# Patient Record
Sex: MALE | Race: OTHER | HISPANIC OR LATINO | Employment: FULL TIME | URBAN - METROPOLITAN AREA
[De-identification: names, ages, dates, MRNs, and addresses within clinical notes are randomized per-mention and may not be internally consistent; named-entity substitution may affect disease eponyms.]

---

## 2021-05-27 ENCOUNTER — APPOINTMENT (EMERGENCY)
Dept: RADIOLOGY | Facility: HOSPITAL | Age: 31
End: 2021-05-27
Payer: COMMERCIAL

## 2021-05-27 ENCOUNTER — HOSPITAL ENCOUNTER (EMERGENCY)
Facility: HOSPITAL | Age: 31
Discharge: HOME | End: 2021-05-27
Attending: EMERGENCY MEDICINE | Admitting: EMERGENCY MEDICINE
Payer: COMMERCIAL

## 2021-05-27 VITALS
HEART RATE: 68 BPM | WEIGHT: 272 LBS | TEMPERATURE: 98 F | SYSTOLIC BLOOD PRESSURE: 114 MMHG | OXYGEN SATURATION: 97 % | RESPIRATION RATE: 12 BRPM | DIASTOLIC BLOOD PRESSURE: 68 MMHG

## 2021-05-27 DIAGNOSIS — J45.41 MODERATE PERSISTENT ASTHMA WITH EXACERBATION: Primary | ICD-10-CM

## 2021-05-27 LAB
ANION GAP SERPL CALCULATED.3IONS-SCNC: 13 MMOL/L (ref 4–13)
ATRIAL RATE: 62 BPM
BASOPHILS # BLD AUTO: 0.04 THOUSANDS/ΜL (ref 0–0.1)
BASOPHILS NFR BLD AUTO: 1 % (ref 0–1)
BUN SERPL-MCNC: 13 MG/DL (ref 5–25)
CALCIUM SERPL-MCNC: 9.4 MG/DL (ref 8.3–10.1)
CHLORIDE SERPL-SCNC: 105 MMOL/L (ref 100–108)
CO2 SERPL-SCNC: 23 MMOL/L (ref 21–32)
CREAT SERPL-MCNC: 0.87 MG/DL (ref 0.6–1.3)
EOSINOPHIL # BLD AUTO: 0.19 THOUSAND/ΜL (ref 0–0.61)
EOSINOPHIL NFR BLD AUTO: 3 % (ref 0–6)
ERYTHROCYTE [DISTWIDTH] IN BLOOD BY AUTOMATED COUNT: 13.1 % (ref 11.6–15.1)
GFR SERPL CREATININE-BSD FRML MDRD: 116 ML/MIN/1.73SQ M
GLUCOSE SERPL-MCNC: 100 MG/DL (ref 65–140)
HCT VFR BLD AUTO: 44.7 % (ref 36.5–49.3)
HGB BLD-MCNC: 15 G/DL (ref 12–17)
IMM GRANULOCYTES # BLD AUTO: 0.02 THOUSAND/UL (ref 0–0.2)
IMM GRANULOCYTES NFR BLD AUTO: 0 % (ref 0–2)
LYMPHOCYTES # BLD AUTO: 1.99 THOUSANDS/ΜL (ref 0.6–4.47)
LYMPHOCYTES NFR BLD AUTO: 29 % (ref 14–44)
MCH RBC QN AUTO: 29.2 PG (ref 26.8–34.3)
MCHC RBC AUTO-ENTMCNC: 33.6 G/DL (ref 31.4–37.4)
MCV RBC AUTO: 87 FL (ref 82–98)
MONOCYTES # BLD AUTO: 0.5 THOUSAND/ΜL (ref 0.17–1.22)
MONOCYTES NFR BLD AUTO: 7 % (ref 4–12)
NEUTROPHILS # BLD AUTO: 4.08 THOUSANDS/ΜL (ref 1.85–7.62)
NEUTS SEG NFR BLD AUTO: 60 % (ref 43–75)
NRBC BLD AUTO-RTO: 0 /100 WBCS
P AXIS: 55 DEGREES
PLATELET # BLD AUTO: 298 THOUSANDS/UL (ref 149–390)
PMV BLD AUTO: 10 FL (ref 8.9–12.7)
POTASSIUM SERPL-SCNC: 4.1 MMOL/L (ref 3.5–5.3)
PR INTERVAL: 144 MS
QRS AXIS: 53 DEGREES
QRSD INTERVAL: 84 MS
QT INTERVAL: 410 MS
QTC INTERVAL: 416 MS
RBC # BLD AUTO: 5.13 MILLION/UL (ref 3.88–5.62)
SODIUM SERPL-SCNC: 141 MMOL/L (ref 136–145)
T WAVE AXIS: 25 DEGREES
TROPONIN I SERPL-MCNC: <0.02 NG/ML
VENTRICULAR RATE: 62 BPM
WBC # BLD AUTO: 6.82 THOUSAND/UL (ref 4.31–10.16)

## 2021-05-27 PROCEDURE — 99284 EMERGENCY DEPT VISIT MOD MDM: CPT

## 2021-05-27 PROCEDURE — 94640 AIRWAY INHALATION TREATMENT: CPT

## 2021-05-27 PROCEDURE — 93010 ELECTROCARDIOGRAM REPORT: CPT | Performed by: INTERNAL MEDICINE

## 2021-05-27 PROCEDURE — 93005 ELECTROCARDIOGRAM TRACING: CPT

## 2021-05-27 PROCEDURE — 84484 ASSAY OF TROPONIN QUANT: CPT | Performed by: PHYSICIAN ASSISTANT

## 2021-05-27 PROCEDURE — 80048 BASIC METABOLIC PNL TOTAL CA: CPT | Performed by: PHYSICIAN ASSISTANT

## 2021-05-27 PROCEDURE — 36415 COLL VENOUS BLD VENIPUNCTURE: CPT | Performed by: PHYSICIAN ASSISTANT

## 2021-05-27 PROCEDURE — 85025 COMPLETE CBC W/AUTO DIFF WBC: CPT | Performed by: PHYSICIAN ASSISTANT

## 2021-05-27 PROCEDURE — 96375 TX/PRO/DX INJ NEW DRUG ADDON: CPT

## 2021-05-27 PROCEDURE — 71045 X-RAY EXAM CHEST 1 VIEW: CPT

## 2021-05-27 PROCEDURE — 96365 THER/PROPH/DIAG IV INF INIT: CPT

## 2021-05-27 PROCEDURE — 99285 EMERGENCY DEPT VISIT HI MDM: CPT | Performed by: PHYSICIAN ASSISTANT

## 2021-05-27 RX ORDER — SODIUM CHLORIDE FOR INHALATION 0.9 %
12 VIAL, NEBULIZER (ML) INHALATION ONCE
Status: COMPLETED | OUTPATIENT
Start: 2021-05-27 | End: 2021-05-27

## 2021-05-27 RX ORDER — PREDNISONE 20 MG/1
TABLET ORAL
Qty: 20 TABLET | Refills: 0 | Status: SHIPPED | OUTPATIENT
Start: 2021-05-27

## 2021-05-27 RX ORDER — IPRATROPIUM BROMIDE AND ALBUTEROL SULFATE 2.5; .5 MG/3ML; MG/3ML
3 SOLUTION RESPIRATORY (INHALATION) 4 TIMES DAILY
Qty: 360 ML | Refills: 0 | Status: SHIPPED | OUTPATIENT
Start: 2021-05-27 | End: 2021-06-26

## 2021-05-27 RX ORDER — ALBUTEROL SULFATE 90 UG/1
2 AEROSOL, METERED RESPIRATORY (INHALATION) EVERY 6 HOURS PRN
COMMUNITY

## 2021-05-27 RX ORDER — DEXAMETHASONE SODIUM PHOSPHATE 4 MG/ML
10 INJECTION, SOLUTION INTRA-ARTICULAR; INTRALESIONAL; INTRAMUSCULAR; INTRAVENOUS; SOFT TISSUE ONCE
Status: COMPLETED | OUTPATIENT
Start: 2021-05-27 | End: 2021-05-27

## 2021-05-27 RX ORDER — MAGNESIUM SULFATE HEPTAHYDRATE 40 MG/ML
2 INJECTION, SOLUTION INTRAVENOUS ONCE
Status: COMPLETED | OUTPATIENT
Start: 2021-05-27 | End: 2021-05-27

## 2021-05-27 RX ADMIN — MAGNESIUM SULFATE HEPTAHYDRATE 2 G: 40 INJECTION, SOLUTION INTRAVENOUS at 08:00

## 2021-05-27 RX ADMIN — ALBUTEROL SULFATE 10 MG: 2.5 SOLUTION RESPIRATORY (INHALATION) at 07:58

## 2021-05-27 RX ADMIN — ISODIUM CHLORIDE 12 ML: 0.03 SOLUTION RESPIRATORY (INHALATION) at 07:58

## 2021-05-27 RX ADMIN — IPRATROPIUM BROMIDE 1 MG: 0.5 SOLUTION RESPIRATORY (INHALATION) at 07:58

## 2021-05-27 RX ADMIN — DEXAMETHASONE SODIUM PHOSPHATE 10 MG: 4 INJECTION INTRA-ARTICULAR; INTRALESIONAL; INTRAMUSCULAR; INTRAVENOUS; SOFT TISSUE at 07:59

## 2021-05-27 NOTE — ED NOTES
Nebulizer dispensed to pt per order and instructed on use  Pt verbalized understanding       Lamin Valle RN  05/27/21 7381

## 2021-05-27 NOTE — Clinical Note
Jacob trevino Zelpha Remedies to the emergency department on 5/27/2021  Return date if applicable: [id number]        If you have any questions or concerns, please don't hesitate to call        Chet Arellano RN

## 2021-05-27 NOTE — DISCHARGE INSTRUCTIONS
X-ray chest 1 view portable   ED Interpretation   Increased lung marks without focal infiltrate        Results for orders placed or performed during the hospital encounter of 05/27/21   CBC and differential   Result Value Ref Range    WBC 6 82 4 31 - 10 16 Thousand/uL    RBC 5 13 3 88 - 5 62 Million/uL    Hemoglobin 15 0 12 0 - 17 0 g/dL    Hematocrit 44 7 36 5 - 49 3 %    MCV 87 82 - 98 fL    MCH 29 2 26 8 - 34 3 pg    MCHC 33 6 31 4 - 37 4 g/dL    RDW 13 1 11 6 - 15 1 %    MPV 10 0 8 9 - 12 7 fL    Platelets 767 579 - 707 Thousands/uL    nRBC 0 /100 WBCs    Neutrophils Relative 60 43 - 75 %    Immat GRANS % 0 0 - 2 %    Lymphocytes Relative 29 14 - 44 %    Monocytes Relative 7 4 - 12 %    Eosinophils Relative 3 0 - 6 %    Basophils Relative 1 0 - 1 %    Neutrophils Absolute 4 08 1 85 - 7 62 Thousands/µL    Immature Grans Absolute 0 02 0 00 - 0 20 Thousand/uL    Lymphocytes Absolute 1 99 0 60 - 4 47 Thousands/µL    Monocytes Absolute 0 50 0 17 - 1 22 Thousand/µL    Eosinophils Absolute 0 19 0 00 - 0 61 Thousand/µL    Basophils Absolute 0 04 0 00 - 0 10 Thousands/µL   Basic metabolic panel   Result Value Ref Range    Sodium 141 136 - 145 mmol/L    Potassium 4 1 3 5 - 5 3 mmol/L    Chloride 105 100 - 108 mmol/L    CO2 23 21 - 32 mmol/L    ANION GAP 13 4 - 13 mmol/L    BUN 13 5 - 25 mg/dL    Creatinine 0 87 0 60 - 1 30 mg/dL    Glucose 100 65 - 140 mg/dL    Calcium 9 4 8 3 - 10 1 mg/dL    eGFR 116 ml/min/1 73sq m   Troponin I   Result Value Ref Range    Troponin I <0 02 <=0 04 ng/mL

## 2021-05-27 NOTE — Clinical Note
Bhargav Serrano accompanied Bennie Guzman to the emergency department on 5/27/2021  Return date if applicable: If you have any questions or concerns, please don't hesitate to call        Karen Sanchez RN

## 2021-05-27 NOTE — ED PROVIDER NOTES
History  Chief Complaint   Patient presents with    Asthma     asthma exacerbation this morning, no relief from albuterol     This is a 70-year-old male with a past medical history of asthma presents to the emergency department with shortness of breath  He reports that the last 2 or 3 days had increased use of his rescue inhaler without relief  The last use of his rescue inhaler this morning was 5:00 a m  He denies fevers chills  He has had a semi productive cough  He denies nausea, vomiting, diarrhea  Denies loss of taste or smell  No sneezing or nasal congestion  He reports chest tightness  No recent travel, lower extremity edema  He is a current smoker  Ongoing      Asthma  Associated symptoms: cough and shortness of breath    Associated symptoms: no abdominal pain, no chest pain, no congestion, no diarrhea, no ear pain, no fatigue, no fever, no headaches, no nausea, no rash, no sore throat and no vomiting        Prior to Admission Medications   Prescriptions Last Dose Informant Patient Reported? Taking? UNKNOWN TO PATIENT   Yes Yes   Sig: BP medication   UNKNOWN TO PATIENT   Yes Yes   Sig: Allergy medication   albuterol (PROVENTIL HFA,VENTOLIN HFA) 90 mcg/act inhaler 5/27/2021 at Unknown time  Yes Yes   Sig: Inhale 2 puffs every 6 (six) hours as needed for wheezing      Facility-Administered Medications: None       Past Medical History:   Diagnosis Date    Allergies     Asthma     Hypertension        Past Surgical History:   Procedure Laterality Date    APPENDECTOMY         History reviewed  No pertinent family history  I have reviewed and agree with the history as documented      E-Cigarette/Vaping     E-Cigarette/Vaping Substances     Social History     Tobacco Use    Smoking status: Current Every Day Smoker     Packs/day: 0 50     Types: Cigarettes    Smokeless tobacco: Never Used   Substance Use Topics    Alcohol use: Not Currently    Drug use: Yes     Types: Marijuana       Review of Systems   Constitutional: Negative for chills, fatigue and fever  HENT: Negative for congestion, ear pain, sneezing and sore throat  Eyes: Negative for pain and visual disturbance  Respiratory: Positive for cough, chest tightness and shortness of breath  Cardiovascular: Negative for chest pain and palpitations  Gastrointestinal: Negative for abdominal pain, diarrhea, nausea and vomiting  Genitourinary: Negative for dysuria and hematuria  Musculoskeletal: Negative for arthralgias and back pain  Skin: Negative for color change and rash  Neurological: Negative for seizures, syncope and headaches  All other systems reviewed and are negative  Physical Exam  Physical Exam  Vitals signs and nursing note reviewed  Constitutional:       General: He is not in acute distress  Appearance: Normal appearance  He is not ill-appearing, toxic-appearing or diaphoretic  HENT:      Head: Normocephalic and atraumatic  Nose: No congestion or rhinorrhea  Mouth/Throat:      Mouth: Mucous membranes are moist    Eyes:      General: No scleral icterus  Pupils: Pupils are equal, round, and reactive to light  Neck:      Musculoskeletal: Normal range of motion  Cardiovascular:      Rate and Rhythm: Normal rate and regular rhythm  Pulses: Normal pulses  Heart sounds: Normal heart sounds  Pulmonary:      Effort: Pulmonary effort is normal       Breath sounds: Wheezing (inspiratory and expiratory in all lung fields) present  Abdominal:      General: Abdomen is flat  There is no distension  Palpations: Abdomen is soft  Tenderness: There is no abdominal tenderness  There is no guarding or rebound  Hernia: No hernia is present  Musculoskeletal: Normal range of motion  General: No swelling or tenderness  Skin:     General: Skin is warm  Capillary Refill: Capillary refill takes less than 2 seconds  Neurological:      General: No focal deficit present  Mental Status: He is alert     Psychiatric:         Mood and Affect: Mood normal          Vital Signs  ED Triage Vitals [05/27/21 0731]   Temperature Pulse Respirations Blood Pressure SpO2   98 °F (36 7 °C) 83 22 137/79 98 %      Temp Source Heart Rate Source Patient Position - Orthostatic VS BP Location FiO2 (%)   Oral Monitor Sitting Right arm --      Pain Score       --           Vitals:    05/27/21 0731 05/27/21 0800 05/27/21 0830   BP: 137/79  114/68   Pulse: 83 60 68   Patient Position - Orthostatic VS: Sitting           Visual Acuity      ED Medications  Medications   ipratropium (ATROVENT) 0 02 % inhalation solution 1 mg (1 mg Nebulization Given 5/27/21 0758)   sodium chloride 0 9 % inhalation solution 12 mL (12 mL Nebulization Given 5/27/21 0758)   magnesium sulfate 2 g/50 mL IVPB (premix) 2 g (0 g Intravenous Stopped 5/27/21 0830)   dexamethasone (DECADRON) injection 10 mg (10 mg Intravenous Given 5/27/21 0759)   albuterol inhalation solution 10 mg (10 mg Nebulization Given 5/27/21 0758)       Diagnostic Studies  Results Reviewed     Procedure Component Value Units Date/Time    Troponin I [[id number]]  (Normal) Collected: 05/27/21 0757    Lab Status: Final result Specimen: Blood from Hand, Right Updated: 05/27/21 0829     Troponin I <0 02 ng/mL     Basic metabolic panel [[id number]] Collected: 05/27/21 0757    Lab Status: Final result Specimen: Blood from Hand, Right Updated: 05/27/21 0821     Sodium 141 mmol/L      Potassium 4 1 mmol/L      Chloride 105 mmol/L      CO2 23 mmol/L      ANION GAP 13 mmol/L      BUN 13 mg/dL      Creatinine 0 87 mg/dL      Glucose 100 mg/dL      Calcium 9 4 mg/dL      eGFR 116 ml/min/1 73sq m     Narrative:      Hi guidelines for Chronic Kidney Disease (CKD):     Stage 1 with normal or high GFR (GFR > 90 mL/min/1 73 square meters)    Stage 2 Mild CKD (GFR = 60-89 mL/min/1 73 square meters)    Stage 3A Moderate CKD (GFR = 45-59 mL/min/1 73 square meters)    Stage 3B Moderate CKD (GFR = 30-44 mL/min/1 73 square meters)    Stage 4 Severe CKD (GFR = 15-29 mL/min/1 73 square meters)    Stage 5 End Stage CKD (GFR <15 mL/min/1 73 square meters)  Note: GFR calculation is accurate only with a steady state creatinine    CBC and differential [[id number]] Collected: 05/27/21 0757    Lab Status: Final result Specimen: Blood from Hand, Right Updated: 05/27/21 0813     WBC 6 82 Thousand/uL      RBC 5 13 Million/uL      Hemoglobin 15 0 g/dL      Hematocrit 44 7 %      MCV 87 fL      MCH 29 2 pg      MCHC 33 6 g/dL      RDW 13 1 %      MPV 10 0 fL      Platelets 502 Thousands/uL      nRBC 0 /100 WBCs      Neutrophils Relative 60 %      Immat GRANS % 0 %      Lymphocytes Relative 29 %      Monocytes Relative 7 %      Eosinophils Relative 3 %      Basophils Relative 1 %      Neutrophils Absolute 4 08 Thousands/µL      Immature Grans Absolute 0 02 Thousand/uL      Lymphocytes Absolute 1 99 Thousands/µL      Monocytes Absolute 0 50 Thousand/µL      Eosinophils Absolute 0 19 Thousand/µL      Basophils Absolute 0 04 Thousands/µL                  X-ray chest 1 view portable   ED Interpretation by Messi Navarro PA-C (05/27 0902)   Increased lung marks without focal infiltrate      Final Result by Marie Ireland MD (05/27 1004)      No acute cardiopulmonary disease                 Workstation performed: [id number]                    Procedures  ECG 12 Lead Documentation Only    Date/Time: 5/27/2021 8:18 AM  Performed by: Messi Navarro PA-C  Authorized by: Skyler Andrews DO     Indications / Diagnosis:  SOB, chest tightness  ECG reviewed by me, the ED Provider: yes    Patient location:  ED  Previous ECG:     Previous ECG:  Unavailable  Interpretation:     Interpretation: normal    Rate:     ECG rate:  62    ECG rate assessment: normal    Rhythm:     Rhythm: sinus rhythm    Ectopy:     Ectopy: none    QRS:     QRS axis:  Normal  Conduction:     Conduction: normal    ST segments:     ST segments:  Normal  T waves:     T waves: normal               ED Course  ED Course as of May 27 1011   Thu May 27, 2021   0817 Cbc is normal      0823 BMP normal      0844 Troponin negative      0912 Patient reassessed  Work of breathing and wheezing has improved significantly after heart neb, magnesium, Decadron  Patient reports significant improvement in his symptoms  Plan at this time will be dispositioned to home with a tapering dose of prednisone, nebulizer and DuoNebs  Anticipatory guidance and return precautions were discussed at length  All questions and the patient's satisfaction  He remained stable under my care in the emergency department  SBIRT 22yo+      Most Recent Value   SBIRT (22 yo +)   In order to provide better care to our patients, we are screening all of our patients for alcohol and drug use  Would it be okay to ask you these screening questions? Yes Filed at: 05/27/2021 0522   Initial Alcohol Screen: US AUDIT-C    1  How often do you have a drink containing alcohol? 1 Filed at: 05/27/2021 0735   2  How many drinks containing alcohol do you have on a typical day you are drinking? 1 Filed at: 05/27/2021 0735   3a  Male UNDER 65: How often do you have five or more drinks on one occasion? 0 Filed at: 05/27/2021 0735   3b  FEMALE Any Age, or MALE 65+: How often do you have 4 or more drinks on one occassion? 0 Filed at: 05/27/2021 0735   Audit-C Score  2 Filed at: 05/27/2021 6836   CHLOE: How many times in the past year have you    Used an illegal drug or used a prescription medication for non-medical reasons? Never Filed at: 05/27/2021 0735                    MDM  Number of Diagnoses or Management Options  Moderate persistent asthma with exacerbation:   Diagnosis management comments: Differential diagnosis includes but is not limited to:  Asthma exacerbation, COPD, pneumonia, URI, bronchospasm    The patient was seen and examined  Laboratory studies revealed no abnoramlities  Imaging revealed bronchospasm  The patient was treated with IV magnesium, decadron, PRAJAPATI nebs  The patient was re-examined after treatment, was significantly improved and disposition of discharge to home with a nebulizer, duoneb and prednisone was made  The test results were discussed with the patient/family  All questions were answered to patient/family's satisfaction  Anticipatory guidance and return precautions were discussed at length  They verbalized understanding and agreement with the plan  The patient remained stable while under my care in the Emergency Department  Amount and/or Complexity of Data Reviewed  Clinical lab tests: ordered and reviewed  Tests in the radiology section of CPT®: ordered and reviewed  Review and summarize past medical records: yes  Independent visualization of images, tracings, or specimens: yes    Risk of Complications, Morbidity, and/or Mortality  Presenting problems: moderate  Diagnostic procedures: moderate  Management options: moderate    Patient Progress  Patient progress: improved      Disposition  Final diagnoses: Moderate persistent asthma with exacerbation     Time reflects when diagnosis was documented in both MDM as applicable and the Disposition within this note     Time User Action Codes Description Comment    5/27/2021  8:51 AM Arthur Warren [[id number]] Moderate persistent asthma with exacerbation       ED Disposition     ED Disposition Condition Date/Time Comment    Discharge Stable Thu May 27, 2021  9:13 AM Ac Morin discharge to home/self care              Follow-up Information     Follow up With Specialties Details Why Contact Info Additional [address] [facility] Emergency Department Emergency Medicine  If symptoms worsen [place] [id number]  [address] [place] [address] Emergency Department, [address] [place] [address] [place], [place], [address]          Discharge Medication List as of 5/27/2021  9:14 AM      START taking these medications    Details   ipratropium-albuterol (DUO-NEB) 0 5-2 5 mg/3 mL nebulizer solution Take 1 vial (3 mL total) by nebulization 4 (four) times a day, Starting Thu 5/27/2021, Until Sat 6/26/2021, Normal      predniSONE 20 mg tablet 3 tabs daily for 3 days then 2 tabs daily for 3 days then 1 tab daily for 3 days then half a tab daily for 3 days and stop, Normal         CONTINUE these medications which have NOT CHANGED    Details   albuterol (PROVENTIL HFA,VENTOLIN HFA) 90 mcg/act inhaler Inhale 2 puffs every 6 (six) hours as needed for wheezing, Historical Med      !! UNKNOWN TO PATIENT BP medication, Historical Med      !! UNKNOWN TO PATIENT Allergy medication, Historical Med       !! - Potential duplicate medications found  Please discuss with provider          Outpatient Discharge Orders   Nebulizer       PDMP Review     None          ED Provider  Electronically Signed by           Brook Dobson PA-C  05/27/21 1011

## 2021-05-27 NOTE — Clinical Note
Jaswant Higgins accompanied Jordon Olson to the emergency department on 5/27/2021  Return date if applicable: If you have any questions or concerns, please don't hesitate to call        Janee Hutchins RN

## 2021-08-26 ENCOUNTER — HOSPITAL ENCOUNTER (EMERGENCY)
Facility: HOSPITAL | Age: 31
Discharge: LEFT BEFORE DISCHARGE | End: 2021-08-26
Payer: COMMERCIAL

## 2021-08-26 VITALS
SYSTOLIC BLOOD PRESSURE: 152 MMHG | WEIGHT: 264.55 LBS | RESPIRATION RATE: 18 BRPM | TEMPERATURE: 98.4 F | OXYGEN SATURATION: 99 % | HEART RATE: 77 BPM | DIASTOLIC BLOOD PRESSURE: 81 MMHG

## 2021-08-26 PROCEDURE — 94640 AIRWAY INHALATION TREATMENT: CPT

## 2021-08-26 RX ORDER — ALBUTEROL SULFATE 2.5 MG/3ML
5 SOLUTION RESPIRATORY (INHALATION) ONCE
Status: COMPLETED | OUTPATIENT
Start: 2021-08-26 | End: 2021-08-26

## 2021-08-26 RX ADMIN — IPRATROPIUM BROMIDE 0.5 MG: 0.5 SOLUTION RESPIRATORY (INHALATION) at 18:05

## 2021-08-26 RX ADMIN — ALBUTEROL SULFATE 5 MG: 2.5 SOLUTION RESPIRATORY (INHALATION) at 18:05

## 2021-08-28 ENCOUNTER — HOSPITAL ENCOUNTER (EMERGENCY)
Facility: HOSPITAL | Age: 31
Discharge: HOME | End: 2021-08-28
Attending: EMERGENCY MEDICINE | Admitting: EMERGENCY MEDICINE
Payer: COMMERCIAL

## 2021-08-28 ENCOUNTER — APPOINTMENT (EMERGENCY)
Dept: RADIOLOGY | Facility: HOSPITAL | Age: 31
End: 2021-08-28
Payer: COMMERCIAL

## 2021-08-28 VITALS
WEIGHT: 264.55 LBS | RESPIRATION RATE: 18 BRPM | TEMPERATURE: 98.6 F | SYSTOLIC BLOOD PRESSURE: 130 MMHG | DIASTOLIC BLOOD PRESSURE: 70 MMHG | OXYGEN SATURATION: 97 % | HEART RATE: 78 BPM

## 2021-08-28 DIAGNOSIS — J45.901 ACUTE ASTHMA EXACERBATION: Primary | ICD-10-CM

## 2021-08-28 DIAGNOSIS — J45.41 MODERATE PERSISTENT ASTHMA WITH EXACERBATION: ICD-10-CM

## 2021-08-28 DIAGNOSIS — R09.1 PLEURISY: ICD-10-CM

## 2021-08-28 PROCEDURE — 99284 EMERGENCY DEPT VISIT MOD MDM: CPT | Performed by: EMERGENCY MEDICINE

## 2021-08-28 PROCEDURE — 71046 X-RAY EXAM CHEST 2 VIEWS: CPT

## 2021-08-28 PROCEDURE — 94640 AIRWAY INHALATION TREATMENT: CPT

## 2021-08-28 PROCEDURE — 99284 EMERGENCY DEPT VISIT MOD MDM: CPT

## 2021-08-28 RX ORDER — ALBUTEROL SULFATE 2.5 MG/3ML
2.5 SOLUTION RESPIRATORY (INHALATION) EVERY 6 HOURS PRN
Qty: 300 ML | Refills: 0 | Status: SHIPPED | OUTPATIENT
Start: 2021-08-28

## 2021-08-28 RX ORDER — PREDNISONE 20 MG/1
60 TABLET ORAL ONCE
Status: COMPLETED | OUTPATIENT
Start: 2021-08-28 | End: 2021-08-28

## 2021-08-28 RX ORDER — PREDNISONE 20 MG/1
60 TABLET ORAL DAILY
Qty: 12 TABLET | Refills: 0 | Status: SHIPPED | OUTPATIENT
Start: 2021-08-28 | End: 2021-09-01

## 2021-08-28 RX ORDER — IPRATROPIUM BROMIDE AND ALBUTEROL SULFATE 2.5; .5 MG/3ML; MG/3ML
3 SOLUTION RESPIRATORY (INHALATION) ONCE
Status: COMPLETED | OUTPATIENT
Start: 2021-08-28 | End: 2021-08-28

## 2021-08-28 RX ADMIN — IPRATROPIUM BROMIDE AND ALBUTEROL SULFATE 3 ML: 2.5; .5 SOLUTION RESPIRATORY (INHALATION) at 01:02

## 2021-08-28 RX ADMIN — PREDNISONE 60 MG: 20 TABLET ORAL at 01:45

## 2021-08-28 NOTE — ED PROVIDER NOTES
History  Chief Complaint   Patient presents with    Pain With Breathing     reports left sided rib pain with breathing and SOB for three days     Patient presents with 3 days of shortness of breath  Patient came to the hospital the other day, was given a nebulizer in the waiting room and left before being seen  Patient states that he has tried his nebulizer at home for the past 3 days without relief  Has intermittent left-sided lower chest pain with this  Patient states that there is no modifying factors to that but sometimes it hurts when he breathes  Patient does have asthma and states that this does feel different  Because nebulizers are not working and his symptoms have been waxing and waning he came in for an evaluation  Has no URI symptoms or sick contacts  No other complaints tonight  History provided by:  Patient   used: No    Shortness of Breath  Severity:  Moderate  Onset quality:  Gradual  Duration:  3 days  Timing:  Intermittent  Progression:  Waxing and waning  Chronicity:  New  Ineffective treatments:  Inhaler  Associated symptoms: chest pain    Associated symptoms: no abdominal pain, no cough, no fever, no neck pain, no rash and no vomiting    Risk factors: no hx of cancer, no hx of PE/DVT, no prolonged immobilization and no recent surgery        Prior to Admission Medications   Prescriptions Last Dose Informant Patient Reported? Taking?    UNKNOWN TO PATIENT   Yes No   Sig: BP medication   UNKNOWN TO PATIENT   Yes No   Sig: Allergy medication   albuterol (PROVENTIL HFA,VENTOLIN HFA) 90 mcg/act inhaler   Yes No   Sig: Inhale 2 puffs every 6 (six) hours as needed for wheezing   predniSONE 20 mg tablet   No No   Sig: 3 tabs daily for 3 days then 2 tabs daily for 3 days then 1 tab daily for 3 days then half a tab daily for 3 days and stop      Facility-Administered Medications: None       Past Medical History:   Diagnosis Date    Allergies     Asthma     Hypertension Past Surgical History:   Procedure Laterality Date    APPENDECTOMY         History reviewed  No pertinent family history  I have reviewed and agree with the history as documented  E-Cigarette/Vaping     E-Cigarette/Vaping Substances     Social History     Tobacco Use    Smoking status: Current Every Day Smoker     Packs/day: 0 50     Types: Cigarettes    Smokeless tobacco: Never Used   Substance Use Topics    Alcohol use: Not Currently    Drug use: Yes     Types: Marijuana       Review of Systems   Constitutional: Negative for chills and fever  Eyes: Negative for visual disturbance  Respiratory: Positive for shortness of breath  Negative for cough and chest tightness  Cardiovascular: Positive for chest pain  Negative for leg swelling  Gastrointestinal: Negative for abdominal pain, nausea and vomiting  Musculoskeletal: Negative for back pain and neck pain  Skin: Negative for color change, pallor, rash and wound  Allergic/Immunologic: Negative for immunocompromised state  Neurological: Negative for dizziness, syncope and light-headedness  All other systems reviewed and are negative  Physical Exam  Physical Exam  Vitals and nursing note reviewed  Constitutional:       General: He is not in acute distress  Appearance: He is well-developed  He is not ill-appearing, toxic-appearing or diaphoretic  HENT:      Head: Normocephalic and atraumatic  Right Ear: External ear normal       Left Ear: External ear normal       Nose: No congestion  Eyes:      General: No scleral icterus  Conjunctiva/sclera: Conjunctivae normal       Right eye: Right conjunctiva is not injected  Left eye: Left conjunctiva is not injected  Neck:      Trachea: No tracheal deviation  Cardiovascular:      Rate and Rhythm: Normal rate  Pulses: Normal pulses  Pulmonary:      Effort: Pulmonary effort is normal  No respiratory distress  Breath sounds: No stridor   Examination of the left-upper field reveals decreased breath sounds  Examination of the left-middle field reveals decreased breath sounds and wheezing  Examination of the left-lower field reveals decreased breath sounds and wheezing  Decreased breath sounds and wheezing present  Skin:     General: Skin is warm and dry  Capillary Refill: Capillary refill takes less than 2 seconds  Coloration: Skin is not pale  Findings: No erythema or rash  Neurological:      Mental Status: He is alert and oriented to person, place, and time  Motor: No abnormal muscle tone  Psychiatric:         Mood and Affect: Mood normal          Behavior: Behavior normal          Vital Signs  ED Triage Vitals [08/28/21 0013]   Temperature Pulse Respirations Blood Pressure SpO2   98 6 °F (37 °C) 82 (!) 23 138/77 97 %      Temp src Heart Rate Source Patient Position - Orthostatic VS BP Location FiO2 (%)   -- Monitor -- -- --      Pain Score       --           Vitals:    08/28/21 0013   BP: 138/77   Pulse: 82         Visual Acuity      ED Medications  Medications   predniSONE tablet 60 mg (has no administration in time range)   ipratropium-albuterol (DUO-NEB) 0 5-2 5 mg/3 mL inhalation solution 3 mL (3 mL Nebulization Given 8/28/21 0102)       Diagnostic Studies  Results Reviewed     None                 XR chest 2 views   ED Interpretation by Brody Charles DO (08/28 0121)   The CXR was ordered by myself and interpreted by me independently  On my read, it appears without acute abnormalities:  - The  cardiomediastinal  silhouette  is  unremarkable     - The  lungs  are  clear  - No  pleural  effusions   - No  pneumothorax    - The  pulmonary  vasculature  is  within  normal  limits     - The  trachea  is  midline     - Bony  thorax  is  unremarkable                            Procedures  Procedures         ED Course                     PERC Rule for PE      Most Recent Value   PERC Rule for PE   Age >=50  0 Filed at: 08/28/2021 0046 HR >=100  0 Filed at: 08/28/2021 0046   O2 Sat on room air < 95%  0 Filed at: 08/28/2021 0046   History of PE or DVT  0 Filed at: 08/28/2021 0046   Recent trauma or surgery  0 Filed at: 08/28/2021 0046   Hemoptysis  0 Filed at: 08/28/2021 0046   Exogenous estrogen  0 Filed at: 08/28/2021 0046   Unilateral leg swelling  0 Filed at: 08/28/2021 0046   PERC Rule for PE Results  0 Filed at: 08/28/2021 0046              SBIRT 22yo+      Most Recent Value   SBIRT (25 yo +)   In order to provide better care to our patients, we are screening all of our patients for alcohol and drug use  Would it be okay to ask you these screening questions? No Filed at: 08/28/2021 0104                    MDM  Number of Diagnoses or Management Options  Acute asthma exacerbation: new and requires workup  Pleurisy: new and requires workup  Diagnosis management comments: Patient presents with shortness of breath for the past 3 days that is intermittent in worsening overall  Does have a history of asthma and on exam does have left-sided wheezing but not right-sided at this time  This is where the patient does have pain  Will give a DuoNeb for treatment, obtain a chest x-ray for possible pneumonia or pneumothorax and re-evaluate  Vital signs are normal and he is PERC negative for PE     1:38 AM  Patient feeling much better  Patient now has lung sounds in all lung fields  Has wheezing now in all lung fields  I believe patient was extremely tight and now is starting to open up which is why were hearing wheezing  Chest x-ray is negative for infection  Offered patient other nebulizer but he states that he feels good enough to go home  Will write him for steroids and strict return ER precautions  Patient understands and agrees with plan of care  Questions and concerns answered         Amount and/or Complexity of Data Reviewed  Tests in the radiology section of CPT®: ordered and reviewed  Review and summarize past medical records: yes  Independent visualization of images, tracings, or specimens: yes    Patient Progress  Patient progress: improved      Disposition  Final diagnoses:   Acute asthma exacerbation   Pleurisy     Time reflects when diagnosis was documented in both MDM as applicable and the Disposition within this note     Time User Action Codes Description Comment    8/28/2021  1:39 AM Orlando Mojica Add [J45 901] Acute asthma exacerbation     8/28/2021  1:39 AM Ashutoshvenkataaissatou Mojica Add [R09 1] Pleurisy     8/28/2021  1:39 AM GeovanyeriglClaire cervantest Add [J45 41] Moderate persistent asthma with exacerbation       ED Disposition     ED Disposition Condition Date/Time Comment    Discharge Stable Sat Aug 28, 2021  1:39 AM Kristopher Bell discharge to home/self care  Follow-up Information     Follow up With Specialties Details Why Contact Info Additional [facility] [place] [facility] Call in 2 days To schedule an appointment as soon as you can [address], [facility] [id number]  [address] [place] [address], [address], [address] [place], [place], [address]          Patient's Medications   Discharge Prescriptions    ALBUTEROL (2 5 MG/3 ML) 0 083 % NEBULIZER SOLUTION    Take 3 mL (2 5 mg total) by nebulization every 6 (six) hours as needed for wheezing or shortness of breath       Start Date: 8/28/2021 End Date: --       Order Dose: 2 5 mg       Quantity: 300 mL    Refills: 0    PREDNISONE 20 MG TABLET    Take 3 tablets (60 mg total) by mouth daily for 4 days       Start Date: 8/28/2021 End Date: 9/1/2021       Order Dose: 60 mg       Quantity: 12 tablet    Refills: 0     No discharge procedures on file      PDMP Review     None          ED Provider  Electronically Signed by           Yandel Angeles DO  08/28/21 0142

## 2025-08-13 ENCOUNTER — TRANSCRIBE ORDERS (OUTPATIENT)
Dept: SLEEP CENTER | Facility: CLINIC | Age: 35
End: 2025-08-13